# Patient Record
Sex: FEMALE | Race: WHITE | URBAN - METROPOLITAN AREA
[De-identification: names, ages, dates, MRNs, and addresses within clinical notes are randomized per-mention and may not be internally consistent; named-entity substitution may affect disease eponyms.]

---

## 2023-06-01 ENCOUNTER — OFFICE VISIT (OUTPATIENT)
Dept: URBAN - METROPOLITAN AREA CLINIC 81 | Facility: CLINIC | Age: 53
End: 2023-06-01
Payer: COMMERCIAL

## 2023-06-01 DIAGNOSIS — H52.4 PRESBYOPIA: Primary | ICD-10-CM

## 2023-06-01 PROCEDURE — 92002 INTRM OPH EXAM NEW PATIENT: CPT | Performed by: OPTOMETRIST

## 2023-06-01 ASSESSMENT — INTRAOCULAR PRESSURE
OS: 16
OD: 13

## 2023-06-01 ASSESSMENT — KERATOMETRY
OD: 44.63
OS: 44.63

## 2023-06-01 ASSESSMENT — VISUAL ACUITY
OS: 20/15
OD: 20/15

## 2023-06-01 NOTE — IMPRESSION/PLAN
Impression: Presbyopia: H52.4.
-computer for extended periods of time Plan: Discussed condition with patient. Dispensed glasses Rx to patient and instructed on normal adaptation period.